# Patient Record
Sex: MALE | Race: WHITE | HISPANIC OR LATINO | URBAN - METROPOLITAN AREA
[De-identification: names, ages, dates, MRNs, and addresses within clinical notes are randomized per-mention and may not be internally consistent; named-entity substitution may affect disease eponyms.]

---

## 2024-03-11 ENCOUNTER — EMERGENCY (EMERGENCY)
Facility: HOSPITAL | Age: 54
LOS: 1 days | Discharge: HOME | End: 2024-03-11
Attending: EMERGENCY MEDICINE
Payer: COMMERCIAL

## 2024-03-11 VITALS
OXYGEN SATURATION: 99 % | DIASTOLIC BLOOD PRESSURE: 94 MMHG | TEMPERATURE: 98 F | RESPIRATION RATE: 16 BRPM | HEART RATE: 81 BPM | SYSTOLIC BLOOD PRESSURE: 151 MMHG

## 2024-03-11 VITALS
OXYGEN SATURATION: 95 % | HEART RATE: 105 BPM | RESPIRATION RATE: 16 BRPM | WEIGHT: 210.1 LBS | TEMPERATURE: 100 F | SYSTOLIC BLOOD PRESSURE: 138 MMHG | DIASTOLIC BLOOD PRESSURE: 80 MMHG

## 2024-03-11 DIAGNOSIS — Z98.890 OTHER SPECIFIED POSTPROCEDURAL STATES: Chronic | ICD-10-CM

## 2024-03-11 LAB
ALBUMIN SERPL ELPH-MCNC: 3.9 G/DL — SIGNIFICANT CHANGE UP (ref 3.5–5)
ALP SERPL-CCNC: 64 U/L — SIGNIFICANT CHANGE UP (ref 40–120)
ALT FLD-CCNC: 23 U/L DA — SIGNIFICANT CHANGE UP (ref 10–60)
ANION GAP SERPL CALC-SCNC: 5 MMOL/L — SIGNIFICANT CHANGE UP (ref 5–17)
ANION GAP SERPL CALC-SCNC: 6 MMOL/L — SIGNIFICANT CHANGE UP (ref 5–17)
AST SERPL-CCNC: 11 U/L — SIGNIFICANT CHANGE UP (ref 10–40)
BASOPHILS # BLD AUTO: 0.04 K/UL — SIGNIFICANT CHANGE UP (ref 0–0.2)
BASOPHILS NFR BLD AUTO: 0.5 % — SIGNIFICANT CHANGE UP (ref 0–2)
BILIRUB SERPL-MCNC: 0.9 MG/DL — SIGNIFICANT CHANGE UP (ref 0.2–1.2)
BUN SERPL-MCNC: 17 MG/DL — SIGNIFICANT CHANGE UP (ref 7–18)
BUN SERPL-MCNC: 17 MG/DL — SIGNIFICANT CHANGE UP (ref 7–18)
CALCIUM SERPL-MCNC: 8.1 MG/DL — LOW (ref 8.4–10.5)
CALCIUM SERPL-MCNC: 9.2 MG/DL — SIGNIFICANT CHANGE UP (ref 8.4–10.5)
CHLORIDE SERPL-SCNC: 105 MMOL/L — SIGNIFICANT CHANGE UP (ref 96–108)
CHLORIDE SERPL-SCNC: 98 MMOL/L — SIGNIFICANT CHANGE UP (ref 96–108)
CO2 SERPL-SCNC: 23 MMOL/L — SIGNIFICANT CHANGE UP (ref 22–31)
CO2 SERPL-SCNC: 26 MMOL/L — SIGNIFICANT CHANGE UP (ref 22–31)
CREAT SERPL-MCNC: 1.29 MG/DL — SIGNIFICANT CHANGE UP (ref 0.5–1.3)
CREAT SERPL-MCNC: 1.67 MG/DL — HIGH (ref 0.5–1.3)
EGFR: 49 ML/MIN/1.73M2 — LOW
EGFR: 67 ML/MIN/1.73M2 — SIGNIFICANT CHANGE UP
EOSINOPHIL # BLD AUTO: 0.02 K/UL — SIGNIFICANT CHANGE UP (ref 0–0.5)
EOSINOPHIL NFR BLD AUTO: 0.2 % — SIGNIFICANT CHANGE UP (ref 0–6)
GLUCOSE SERPL-MCNC: 143 MG/DL — HIGH (ref 70–99)
GLUCOSE SERPL-MCNC: 165 MG/DL — HIGH (ref 70–99)
HCT VFR BLD CALC: 45.3 % — SIGNIFICANT CHANGE UP (ref 39–50)
HGB BLD-MCNC: 15.3 G/DL — SIGNIFICANT CHANGE UP (ref 13–17)
HIV 1 & 2 AB SERPL IA.RAPID: SIGNIFICANT CHANGE UP
IMM GRANULOCYTES NFR BLD AUTO: 0.5 % — SIGNIFICANT CHANGE UP (ref 0–0.9)
LACTATE SERPL-SCNC: 1.2 MMOL/L — SIGNIFICANT CHANGE UP (ref 0.7–2)
LYMPHOCYTES # BLD AUTO: 1.14 K/UL — SIGNIFICANT CHANGE UP (ref 1–3.3)
LYMPHOCYTES # BLD AUTO: 13.5 % — SIGNIFICANT CHANGE UP (ref 13–44)
MAGNESIUM SERPL-MCNC: 2.1 MG/DL — SIGNIFICANT CHANGE UP (ref 1.6–2.6)
MCHC RBC-ENTMCNC: 30.7 PG — SIGNIFICANT CHANGE UP (ref 27–34)
MCHC RBC-ENTMCNC: 33.8 GM/DL — SIGNIFICANT CHANGE UP (ref 32–36)
MCV RBC AUTO: 90.8 FL — SIGNIFICANT CHANGE UP (ref 80–100)
MONOCYTES # BLD AUTO: 0.71 K/UL — SIGNIFICANT CHANGE UP (ref 0–0.9)
MONOCYTES NFR BLD AUTO: 8.4 % — SIGNIFICANT CHANGE UP (ref 2–14)
NEUTROPHILS # BLD AUTO: 6.47 K/UL — SIGNIFICANT CHANGE UP (ref 1.8–7.4)
NEUTROPHILS NFR BLD AUTO: 76.9 % — SIGNIFICANT CHANGE UP (ref 43–77)
NRBC # BLD: 0 /100 WBCS — SIGNIFICANT CHANGE UP (ref 0–0)
PHOSPHATE SERPL-MCNC: 1.7 MG/DL — LOW (ref 2.5–4.5)
PLATELET # BLD AUTO: 262 K/UL — SIGNIFICANT CHANGE UP (ref 150–400)
POTASSIUM SERPL-MCNC: 3.9 MMOL/L — SIGNIFICANT CHANGE UP (ref 3.5–5.3)
POTASSIUM SERPL-MCNC: 4 MMOL/L — SIGNIFICANT CHANGE UP (ref 3.5–5.3)
POTASSIUM SERPL-SCNC: 3.9 MMOL/L — SIGNIFICANT CHANGE UP (ref 3.5–5.3)
POTASSIUM SERPL-SCNC: 4 MMOL/L — SIGNIFICANT CHANGE UP (ref 3.5–5.3)
PROT SERPL-MCNC: 8.3 G/DL — SIGNIFICANT CHANGE UP (ref 6–8.3)
RAPID RVP RESULT: SIGNIFICANT CHANGE UP
RBC # BLD: 4.99 M/UL — SIGNIFICANT CHANGE UP (ref 4.2–5.8)
RBC # FLD: 12.2 % — SIGNIFICANT CHANGE UP (ref 10.3–14.5)
SARS-COV-2 RNA SPEC QL NAA+PROBE: SIGNIFICANT CHANGE UP
SODIUM SERPL-SCNC: 130 MMOL/L — LOW (ref 135–145)
SODIUM SERPL-SCNC: 133 MMOL/L — LOW (ref 135–145)
WBC # BLD: 8.42 K/UL — SIGNIFICANT CHANGE UP (ref 3.8–10.5)
WBC # FLD AUTO: 8.42 K/UL — SIGNIFICANT CHANGE UP (ref 3.8–10.5)

## 2024-03-11 PROCEDURE — 99284 EMERGENCY DEPT VISIT MOD MDM: CPT

## 2024-03-11 PROCEDURE — 80053 COMPREHEN METABOLIC PANEL: CPT

## 2024-03-11 PROCEDURE — 36415 COLL VENOUS BLD VENIPUNCTURE: CPT

## 2024-03-11 PROCEDURE — 0225U NFCT DS DNA&RNA 21 SARSCOV2: CPT

## 2024-03-11 PROCEDURE — 96374 THER/PROPH/DIAG INJ IV PUSH: CPT

## 2024-03-11 PROCEDURE — 86703 HIV-1/HIV-2 1 RESULT ANTBDY: CPT

## 2024-03-11 PROCEDURE — 80048 BASIC METABOLIC PNL TOTAL CA: CPT

## 2024-03-11 PROCEDURE — 84100 ASSAY OF PHOSPHORUS: CPT

## 2024-03-11 PROCEDURE — 83735 ASSAY OF MAGNESIUM: CPT

## 2024-03-11 PROCEDURE — 85025 COMPLETE CBC W/AUTO DIFF WBC: CPT

## 2024-03-11 PROCEDURE — 83605 ASSAY OF LACTIC ACID: CPT

## 2024-03-11 PROCEDURE — 96361 HYDRATE IV INFUSION ADD-ON: CPT

## 2024-03-11 PROCEDURE — 96375 TX/PRO/DX INJ NEW DRUG ADDON: CPT

## 2024-03-11 PROCEDURE — 99284 EMERGENCY DEPT VISIT MOD MDM: CPT | Mod: 25

## 2024-03-11 RX ORDER — METOCLOPRAMIDE HCL 10 MG
1 TABLET ORAL
Qty: 15 | Refills: 0
Start: 2024-03-11 | End: 2024-03-15

## 2024-03-11 RX ORDER — SODIUM CHLORIDE 9 MG/ML
2000 INJECTION INTRAMUSCULAR; INTRAVENOUS; SUBCUTANEOUS ONCE
Refills: 0 | Status: COMPLETED | OUTPATIENT
Start: 2024-03-11 | End: 2024-03-11

## 2024-03-11 RX ORDER — KETOROLAC TROMETHAMINE 30 MG/ML
15 SYRINGE (ML) INJECTION ONCE
Refills: 0 | Status: DISCONTINUED | OUTPATIENT
Start: 2024-03-11 | End: 2024-03-11

## 2024-03-11 RX ORDER — KETOROLAC TROMETHAMINE 30 MG/ML
1 SYRINGE (ML) INJECTION
Qty: 15 | Refills: 0
Start: 2024-03-11 | End: 2024-03-15

## 2024-03-11 RX ORDER — SODIUM CHLORIDE 9 MG/ML
1000 INJECTION, SOLUTION INTRAVENOUS ONCE
Refills: 0 | Status: COMPLETED | OUTPATIENT
Start: 2024-03-11 | End: 2024-03-11

## 2024-03-11 RX ORDER — SODIUM,POTASSIUM PHOSPHATES 278-250MG
1 POWDER IN PACKET (EA) ORAL ONCE
Refills: 0 | Status: COMPLETED | OUTPATIENT
Start: 2024-03-11 | End: 2024-03-11

## 2024-03-11 RX ORDER — METOCLOPRAMIDE HCL 10 MG
10 TABLET ORAL ONCE
Refills: 0 | Status: COMPLETED | OUTPATIENT
Start: 2024-03-11 | End: 2024-03-11

## 2024-03-11 RX ADMIN — SODIUM CHLORIDE 1000 MILLILITER(S): 9 INJECTION, SOLUTION INTRAVENOUS at 13:07

## 2024-03-11 RX ADMIN — Medication 1 PACKET(S): at 15:48

## 2024-03-11 RX ADMIN — Medication 15 MILLIGRAM(S): at 10:29

## 2024-03-11 RX ADMIN — SODIUM CHLORIDE 2000 MILLILITER(S): 9 INJECTION INTRAMUSCULAR; INTRAVENOUS; SUBCUTANEOUS at 12:21

## 2024-03-11 RX ADMIN — Medication 15 MILLIGRAM(S): at 12:28

## 2024-03-11 RX ADMIN — SODIUM CHLORIDE 4000 MILLILITER(S): 9 INJECTION INTRAMUSCULAR; INTRAVENOUS; SUBCUTANEOUS at 10:29

## 2024-03-11 RX ADMIN — Medication 10 MILLIGRAM(S): at 10:28

## 2024-03-11 NOTE — ED PROVIDER NOTE - OBJECTIVE STATEMENT
53 male with hx of [no known medical problems].   Pt presenting to the ED reporting 1 week of URI symptoms, myalgias, & headache.  Patient reports being seen in urgent care while visiting family in [place] and started on Augmentin a few days ago with no improvement.

## 2024-03-11 NOTE — ED PROVIDER NOTE - PATIENT PORTAL LINK FT
You can access the FollowMyHealth Patient Portal offered by [facility] by registering at the following website: [email or web address]. By joining [employer]’s FollowMyHealth portal, you will also be able to view your health information using other applications (apps) compatible with our system.

## 2024-03-11 NOTE — ED PROVIDER NOTE - CLINICAL SUMMARY MEDICAL DECISION MAKING FREE TEXT BOX
53 male with hx of [no known medical problems].   Pt presenting to the ED reporting 1 week of URI symptoms, myalgias, & headache.  Patient reports being seen in urgent care while visiting family in [place] and started on Augmentin a few days ago with no improvement.    Vitals with fever & tachycardia.  Nontoxic appearing, n/v intact.  Airway intact, no respiratory distress, no hypoxia.  No abdominal or CVA tenderness.  Nonfocal neuro.  No sinus tenderness.  Low suspicion of intracranial hemorrhage or meningoencephalitis infection.    Plan to obtain:    -Labs, IV fluids, analgesia/antiemetics needed, observe/reassess    Lab values demonstrate no acute/emergent pathology.  My independent interpretation of the EKG: Sinus @ [], normal axis, normal intervals, normal ST/T  My independent interpretation of XR: [No consolidation/effusion/pntx]    [***]    [Patient advised regarding need for close outpatient follow up.  Patient stable for further care in outpatient setting. No indication for inpatient admission at this time. Patient advised regarding symptomatic & supportive care and symptoms to prompt ED return. Strict return precautions provided.]    [Patient requires inpatient admission for further care & stabilization. Care signed out to inpatient team.] 53 male with hx of [no known medical problems].   Pt presenting to the ED reporting 1 week of URI symptoms, myalgias, & headache.  Patient reports being seen in urgent care while visiting family in [place] and started on Augmentin a few days ago with no improvement.    Vitals with fever & tachycardia.  Nontoxic appearing, n/v intact.  Airway intact, no respiratory distress, no hypoxia.  No abdominal or CVA tenderness.  Nonfocal neuro.  No sinus tenderness.  Low suspicion of intracranial hemorrhage or meningoencephalitis infection.    Plan to obtain:    -Labs, IV fluids, analgesia/antiemetics needed, observe/reassess    Lab values w initial decreased Cr likely from dehydration. Repeat BMP w improved Cr.  IV fluids, analgesia, & antipyretics given w improved symptoms.  Nonfocal neuro on reassessment.  Patient advised regarding need for close outpatient follow up.  Patient stable for further care in outpatient setting. No indication for inpatient admission at this time. Patient advised regarding symptomatic & supportive care and symptoms to prompt ED return. Strict return precautions provided.

## 2024-03-11 NOTE — ED PROVIDER NOTE - PHYSICAL EXAMINATION
Gen:  Awake, alert, NAD, WDWN, NCAT, non-toxic appearing. No skull/facial tender, no step-offs, no raccoon/ley.  Eyes:  PERRL, EOMI, no icterus, normal lids/lashes, normal conjunctivae.  ENT:  External inspection normal, pink/moist membranes. Pharynx normal, no pharyngeal erythema/exudate, no drooling, no lip/tongue/palate/posterior pharynx edema, midline uvula, no oropharyngeal ulcerations/lesions. No dental pain/tender, no septal hematoma, no sinus/tmj/dental/temporal/mastoid tender. TM's & canals normal b/l.  CV:  S1S2, regular rate and rhythm, no murmur/gallops/rubs, no JVD, 2+ pulses b/l, no edema/cords/homans, warm/well-perfused.  Resp:  Normal respiratory rate/effort, no respiratory distress, normal voice, speaking full sentences, lungs clear to auscultation b/l, no wheezing/rales/rhonchi, no retractions, no stridor.  Abd:  Soft abdomen, no tender/distended/guarding/rebound, no pulsatile mass, no CVA tender.   Musculoskeletal:  N/V intact, FROM all 4 extremities, normal motor tone, stable gait.   Neck:  FROM neck, supple, trachea midline, no meningismus.   Skin:  Color normal for race, warm and dry, no rash.  Neuro:  Oriented x3, CN 2-12 intact, normal motor, normal sensory, normal cerebellar, normal gait. GCS 15  Psych:  Attention normal. Affect normal. Behavior normal. Judgment normal.

## 2024-03-11 NOTE — ED PROVIDER NOTE - NSFOLLOWUPINSTRUCTIONS_ED_ALL_ED_FT
Please follow up with your PMD or Medicine Clinic in 2-3 days.  Return to the ER for worsening or concerning symptoms.  Drink plenty of fluids or an oral rehydration solution like Pedialyte, Gatorade, or Powerade.  Keep your diet simple until symptoms improve. Introduce foods as tolerated such as bread, toast, plain rice, boiled potatoes, boiled chicken, bananas, apple sauce, etc. Avoid dairy, spicy, greasy, or fatty foods. Avoid alcohol or tobacco.  Quarantine at home for 5-10 days after the start of symptoms. Isolate from any family members you live with   Take Acetaminophen (Tylenol) 650mg every 6 hours as needed for pain or fever.  Take Ketorolac (Toradol) 10mg every 8 hours as needed for pain with food.  Take Sudafed during the day & Benadryl at night for congestion & sinus pressure  Can also take Afrin for up to 3 days for nasal congestion  Take Metoclopramide (Reglan) every 8 hours as needed for nausea/vomiting or headache    - - - - - - - - - - - - -  Viral Respiratory Infection  A viral respiratory infection is an illness that affects parts of the body that are used for breathing. These include the lungs, nose, and throat. It is caused by a germ called a virus.    Some examples of this kind of infection are:  A cold.  The flu (influenza).  A respiratory syncytial virus (RSV) infection.  What are the causes?  This condition is caused by a virus. It spreads from person to person. You can get the virus if:  You breathe in droplets from someone who is sick.  You come in contact with people who are sick.  You touch mucus or other fluid from a person who is sick.  What are the signs or symptoms?  Symptoms of this condition include:  A stuffy or runny nose.  A sore throat.  A cough.  Shortness of breath.  Trouble breathing.  Yellow or green fluid in the nose.  Other symptoms may include:  A fever.  Sweating or chills.  Tiredness (fatigue).  Achy muscles.  A headache.  How is this treated?  This condition may be treated with:  Medicines that treat viruses.  Medicines that make it easy to breathe.  Medicines that are sprayed into the nose.  Acetaminophen or NSAIDs, such as ibuprofen, to treat fever.  Follow these instructions at home:  Managing pain and congestion    Take over-the-counter and prescription medicines only as told by your doctor.  If you have a sore throat, gargle with salt water. Do this 3–4 times a day or as needed.  To make salt water, dissolve ½–1 tsp (3–6 g) of salt in 1 cup (237 mL) of warm water. Make sure that all the salt dissolves.  Use nose drops made from salt water. This helps with stuffiness (congestion). It also helps soften the skin around your nose.  Take 2 tsp (10 mL) of honey at bedtime to lessen coughing at night.  Do not give honey to children who are younger than 1 year old.  Drink enough fluid to keep your pee (urine) pale yellow.  General instructions    A sign telling the reader not to smoke.  Rest as much as possible.  Do not drink alcohol.  Do not smoke or use any products that contain nicotine or tobacco. If you need help quitting, ask your doctor.  Keep all follow-up visits.  How is this prevented?    Washing hands with soap and water.  A person covering her mouth and nose with a cloth while sneezing.  Get a flu shot every year. Ask your doctor when you should get your flu shot.  Do not let other people get your germs. If you are sick:  Wash your hands with soap and water often. Wash your hands after you cough or sneeze. Wash hands for at least 20 seconds. If you cannot use soap and water, use hand .  Cover your mouth when you cough. Cover your nose and mouth when you sneeze.  Do not share cups or eating utensils.  Clean commonly used objects often. Clean commonly touched surfaces.  Stay home from work or school.  Avoid contact with people who are sick during cold and flu season. This is in fall and winter.  Get help if:  Your symptoms last for 10 days or longer.  Your symptoms get worse over time.  You have very bad pain in your face or forehead.  Parts of your jaw or neck get very swollen.  You have shortness of breath.  Get help right away if:  You feel pain or pressure in your chest.  You have trouble breathing.  You faint or feel like you will faint.  You keep vomiting and it gets worse.  You feel confused.  These symptoms may be an emergency. Get help right away. Call your local emergency services (911 in the [place]  Do not wait to see if the symptoms will go away.  Do not drive yourself to the hospital.  Summary  A viral respiratory infection is an illness that affects parts of the body that are used for breathing.  Examples of this illness include a cold, the flu, and a respiratory syncytial virus (RSV) infection.  The infection can cause a runny nose, cough, sore throat, and fever.  Follow what your doctor tells you about taking medicines, drinking lots of fluid, washing your hands, resting at home, and avoiding people who are sick.  This information is not intended to replace advice given to you by your health care provider. Make sure you discuss any questions you have with your health care provider.  - - - - - - - - - - - - -  Dehydration, Adult  Dehydration is a condition in which there is not enough water or other fluids in the body. This happens when a person loses more fluids than they take in. Important organs cannot work right without the right amount of fluids. Any loss of fluids from the body can cause dehydration.    Dehydration can be mild, worse, or very bad. It should be treated right away to keep it from getting very bad.    What are the causes?  Conditions that cause loss of water in the body. They include:  Watery poop (diarrhea).  Vomiting.  Sweating a lot.  Fever.  Infection.  Peeing (urinating) a lot.  Not drinking enough fluids.  Certain medicines, such as medicines that take extra fluid out of the body (diuretics).  Lack of safe drinking water.  Not being able to get enough water and food.  What increases the risk?  Having a long-term (chronic) illness that has not been treated the right way, such as:  Diabetes.  Heart disease.  Kidney disease.  Being 65 years of age or older.  Having a disability.  Living in a place that is high above the ground or sea (high in altitude). The thinner, drier air causes more fluid loss.  Doing exercises that put stress on your body for a long time.  Being active when in hot places.  What are the signs or symptoms?  Symptoms of dehydration depend on how bad it is.    Mild or worse dehydration    Thirst.  Dry lips or dry mouth.  Feeling dizzy or light-headed.  Muscle cramps.  Passing little pee or dark pee. Pee may be the color of tea.  Headache.  Very bad dehydration    Changes in skin. Skin may:  Be cold to the touch (clammy).  Be blotchy or pale.  Not go back to normal right after you pinch it and let it go.  Little or no tears, pee, or sweat.  Fast breathing.  Low blood pressure.  Weak pulse.  Pulse that is more than 100 beats a minute when you are sitting still.  Other changes, such as:  Feeling very thirsty.  Eyes that look hollow (sunken).  Cold hands and feet.  Being confused.  Being very tired (lethargic) or having trouble waking from sleep.  Losing weight.  Loss of consciousness.  How is this treated?  A person's hand, showing an inserted IV catheter.   Treatment for this condition depends on how bad your dehydration is. Treatment should start right away. Do not wait until your condition gets very bad. Very bad dehydration is an emergency. You will need to go to a hospital.  Mild or worse dehydration can be treated at home. You may be asked to:  Drink more fluids.  Drink an oral rehydration solution (ORS). This drink gives you the right amount of fluids, salts, and minerals (electrolytes).  Very bad dehydration can be treated:  With fluids through an IV tube.  By correcting low levels of electrolytes in the body.  By treating the problem that caused your dehydration.  Follow these instructions at home:  Oral rehydration solution    A glass of water with a spoonful of ORS ready to be added to it.  If told by your doctor, drink an ORS:  Make an ORS. Use instructions on the package.  Start by drinking small amounts, about ½ cup (120 mL) every 5–10 minutes.  Slowly drink more until you have had the amount that your doctor said to have.  Eating and drinking    A person drinking water from a glass.   Drink enough clear fluid to keep your pee pale yellow. If you were told to drink an ORS, finish the ORS first. Then, start slowly drinking other clear fluids. Drink fluids such as:  Water. Do not drink only water. Doing that can make the salt (sodium) level in your body get too low.  Water from ice chips you suck on.  Fruit juice that you have added water to (diluted).  Low-calorie sports drinks.  Eat foods that have the right amounts of salts and minerals, such as bananas, oranges, potatoes, tomatoes, or spinach.  Do not drink alcohol.  Avoid drinks that have caffeine or sugar. These include::  High-calorie sports drinks.  Fruit juice that you did not add water to.  Soda.  Coffee or energy drinks.  Avoid foods that are greasy or have a lot of fat or sugar.  General instructions    Take over-the-counter and prescription medicines only as told by your doctor.  Do not take sodium tablets. Doing that can make the salt level in your body get too high.  Return to your normal activities as told by your doctor. Ask your doctor what activities are safe for you.  Keep all follow-up visits. Your doctor may check and change your treatment.  Contact a doctor if:  You have pain in your belly (abdomen) and the pain:  Gets worse.  Stays in one place.  You have a rash.  You have a stiff neck.  You get angry or annoyed more easily than normal.  You are more tired or have a harder time waking than normal.  You feel weak or dizzy.  You feel very thirsty.  Get help right away if:  You have any symptoms of very bad dehydration.  You vomit every time you eat or drink.  Your vomiting gets worse, does not go away, or you vomit blood or green stuff.  You are getting treatment, but symptoms are getting worse.  You have a fever.  You have a very bad headache.  You have:  Diarrhea that gets worse or does not go away.  Blood in your poop (stool). This may cause poop to look black and tarry.  No pee in 6–8 hours.  Only a small amount of pee in 6–8 hours, and the pee is very dark.  You have trouble breathing.  These symptoms may be an emergency. Get help right away. Call 911.  Do not wait to see if the symptoms will go away.  Do not drive yourself to the hospital.  This information is not intended to replace advice given to you by your health care provider. Make sure you discuss any questions you have with your health care provider.

## 2024-03-11 NOTE — ED PROVIDER NOTE - NS ED ROS FT
Constitutional: (+) fever (+) chills  HENT: (+) congestion (+) rhinorrhea (-) sore throat (+) sinus pressure b/l  Eyes: (-) pain (-) redness  Respiratory: (-) cough (-) shortness of breath (-) wheezing (-) stridor    Cardiovascular: (-) chest pain (-) palpitations (-) leg swelling  Gastrointestinal: (-) abdominal pain (-) blood in stool (no melena/hematochezia) (-) diarrhea (-) vomiting  Genitourinary: (-) dysuria (-) hematuria  Musculoskeletal: (-) gait problem (-) joint swelling (+) myalgias  Skin: (-) color change (-) rash  Neurological: (-) weakness (-) numbness (+) headaches  Psychiatric/Behavioral: (-) confusion

## 2024-03-11 NOTE — ED PROVIDER NOTE - NSFOLLOWUPCLINICS_GEN_ALL_ED_FT
[facility]  Internal Medicine  [address] [place] [address]  Phone: [phone]  Fax: [phone]  Follow Up Time: 1-3 Days

## 2024-03-13 ENCOUNTER — EMERGENCY (EMERGENCY)
Facility: HOSPITAL | Age: 54
LOS: 1 days | Discharge: HOME | End: 2024-03-13
Attending: EMERGENCY MEDICINE
Payer: COMMERCIAL

## 2024-03-13 VITALS
RESPIRATION RATE: 18 BRPM | DIASTOLIC BLOOD PRESSURE: 78 MMHG | HEART RATE: 69 BPM | SYSTOLIC BLOOD PRESSURE: 134 MMHG | OXYGEN SATURATION: 96 % | TEMPERATURE: 98 F

## 2024-03-13 VITALS
HEIGHT: 71 IN | HEART RATE: 83 BPM | OXYGEN SATURATION: 98 % | TEMPERATURE: 98 F | SYSTOLIC BLOOD PRESSURE: 124 MMHG | WEIGHT: 210.1 LBS | DIASTOLIC BLOOD PRESSURE: 84 MMHG | RESPIRATION RATE: 18 BRPM

## 2024-03-13 DIAGNOSIS — Z98.890 OTHER SPECIFIED POSTPROCEDURAL STATES: Chronic | ICD-10-CM

## 2024-03-13 PROBLEM — Z78.9 OTHER SPECIFIED HEALTH STATUS: Chronic | Status: ACTIVE | Noted: 2024-03-11

## 2024-03-13 LAB
ANION GAP SERPL CALC-SCNC: 6 MMOL/L — SIGNIFICANT CHANGE UP (ref 5–17)
APTT BLD: 30.9 SEC — SIGNIFICANT CHANGE UP (ref 24.5–35.6)
BASOPHILS # BLD AUTO: 0.05 K/UL — SIGNIFICANT CHANGE UP (ref 0–0.2)
BASOPHILS NFR BLD AUTO: 0.7 % — SIGNIFICANT CHANGE UP (ref 0–2)
BUN SERPL-MCNC: 13 MG/DL — SIGNIFICANT CHANGE UP (ref 7–18)
CALCIUM SERPL-MCNC: 8.7 MG/DL — SIGNIFICANT CHANGE UP (ref 8.4–10.5)
CHLORIDE SERPL-SCNC: 104 MMOL/L — SIGNIFICANT CHANGE UP (ref 96–108)
CO2 SERPL-SCNC: 26 MMOL/L — SIGNIFICANT CHANGE UP (ref 22–31)
CREAT SERPL-MCNC: 1.26 MG/DL — SIGNIFICANT CHANGE UP (ref 0.5–1.3)
EGFR: 69 ML/MIN/1.73M2 — SIGNIFICANT CHANGE UP
EOSINOPHIL # BLD AUTO: 0.08 K/UL — SIGNIFICANT CHANGE UP (ref 0–0.5)
EOSINOPHIL NFR BLD AUTO: 1 % — SIGNIFICANT CHANGE UP (ref 0–6)
GLUCOSE SERPL-MCNC: 140 MG/DL — HIGH (ref 70–99)
HCT VFR BLD CALC: 41.4 % — SIGNIFICANT CHANGE UP (ref 39–50)
HGB BLD-MCNC: 14 G/DL — SIGNIFICANT CHANGE UP (ref 13–17)
IMM GRANULOCYTES NFR BLD AUTO: 0.4 % — SIGNIFICANT CHANGE UP (ref 0–0.9)
INR BLD: 1.21 RATIO — HIGH (ref 0.85–1.18)
LYMPHOCYTES # BLD AUTO: 1.23 K/UL — SIGNIFICANT CHANGE UP (ref 1–3.3)
LYMPHOCYTES # BLD AUTO: 16 % — SIGNIFICANT CHANGE UP (ref 13–44)
MCHC RBC-ENTMCNC: 31 PG — SIGNIFICANT CHANGE UP (ref 27–34)
MCHC RBC-ENTMCNC: 33.8 GM/DL — SIGNIFICANT CHANGE UP (ref 32–36)
MCV RBC AUTO: 91.8 FL — SIGNIFICANT CHANGE UP (ref 80–100)
MONOCYTES # BLD AUTO: 0.55 K/UL — SIGNIFICANT CHANGE UP (ref 0–0.9)
MONOCYTES NFR BLD AUTO: 7.2 % — SIGNIFICANT CHANGE UP (ref 2–14)
NEUTROPHILS # BLD AUTO: 5.73 K/UL — SIGNIFICANT CHANGE UP (ref 1.8–7.4)
NEUTROPHILS NFR BLD AUTO: 74.7 % — SIGNIFICANT CHANGE UP (ref 43–77)
NRBC # BLD: 0 /100 WBCS — SIGNIFICANT CHANGE UP (ref 0–0)
PLATELET # BLD AUTO: 240 K/UL — SIGNIFICANT CHANGE UP (ref 150–400)
POTASSIUM SERPL-MCNC: 3.7 MMOL/L — SIGNIFICANT CHANGE UP (ref 3.5–5.3)
POTASSIUM SERPL-SCNC: 3.7 MMOL/L — SIGNIFICANT CHANGE UP (ref 3.5–5.3)
PROTHROM AB SERPL-ACNC: 13.7 SEC — HIGH (ref 9.5–13)
RBC # BLD: 4.51 M/UL — SIGNIFICANT CHANGE UP (ref 4.2–5.8)
RBC # FLD: 12.1 % — SIGNIFICANT CHANGE UP (ref 10.3–14.5)
SODIUM SERPL-SCNC: 136 MMOL/L — SIGNIFICANT CHANGE UP (ref 135–145)
WBC # BLD: 7.67 K/UL — SIGNIFICANT CHANGE UP (ref 3.8–10.5)
WBC # FLD AUTO: 7.67 K/UL — SIGNIFICANT CHANGE UP (ref 3.8–10.5)

## 2024-03-13 PROCEDURE — 36415 COLL VENOUS BLD VENIPUNCTURE: CPT

## 2024-03-13 PROCEDURE — 70450 CT HEAD/BRAIN W/O DYE: CPT | Mod: 26,MC

## 2024-03-13 PROCEDURE — 80048 BASIC METABOLIC PNL TOTAL CA: CPT

## 2024-03-13 PROCEDURE — 85025 COMPLETE CBC W/AUTO DIFF WBC: CPT

## 2024-03-13 PROCEDURE — 96374 THER/PROPH/DIAG INJ IV PUSH: CPT

## 2024-03-13 PROCEDURE — 99284 EMERGENCY DEPT VISIT MOD MDM: CPT | Mod: 25

## 2024-03-13 PROCEDURE — 85730 THROMBOPLASTIN TIME PARTIAL: CPT

## 2024-03-13 PROCEDURE — 70450 CT HEAD/BRAIN W/O DYE: CPT | Mod: MC

## 2024-03-13 PROCEDURE — 99285 EMERGENCY DEPT VISIT HI MDM: CPT

## 2024-03-13 PROCEDURE — 85610 PROTHROMBIN TIME: CPT

## 2024-03-13 RX ORDER — SODIUM CHLORIDE 9 MG/ML
1000 INJECTION INTRAMUSCULAR; INTRAVENOUS; SUBCUTANEOUS ONCE
Refills: 0 | Status: COMPLETED | OUTPATIENT
Start: 2024-03-13 | End: 2024-03-13

## 2024-03-13 RX ORDER — MORPHINE SULFATE 50 MG/1
4 CAPSULE, EXTENDED RELEASE ORAL ONCE
Refills: 0 | Status: DISCONTINUED | OUTPATIENT
Start: 2024-03-13 | End: 2024-03-13

## 2024-03-13 RX ADMIN — MORPHINE SULFATE 4 MILLIGRAM(S): 50 CAPSULE, EXTENDED RELEASE ORAL at 04:42

## 2024-03-13 RX ADMIN — SODIUM CHLORIDE 1000 MILLILITER(S): 9 INJECTION INTRAMUSCULAR; INTRAVENOUS; SUBCUTANEOUS at 04:42

## 2024-03-13 NOTE — ED ADULT NURSE NOTE - NSFALLUNIVINTERV_ED_ALL_ED
Bed/Stretcher in lowest position, wheels locked, appropriate side rails in place/Call bell, personal items and telephone in reach/Instruct patient to call for assistance before getting out of bed/chair/stretcher/Non-slip footwear applied when patient is off stretcher/[place] to call system/Physically safe environment - no spills, clutter or unnecessary equipment/Purposeful proactive rounding/Room/bathroom lighting operational, light cord in reach

## 2024-03-13 NOTE — ED PROVIDER NOTE - NSFOLLOWUPCLINICS_GEN_ALL_ED_FT
[facility]  Neurology  [address] [place] [address]  Phone: [phone]  Fax: [phone]

## 2024-03-13 NOTE — ED PROVIDER NOTE - CLINICAL SUMMARY MEDICAL DECISION MAKING FREE TEXT BOX
CT reported no acute intracranial pathology. There is left maxillary and sphenoid sinus mucosal thickening. The   mastoid air cells are well aerated.   6:40 AM patient feels better, states headache resolved.  Patient advised to continue with antibiotics given report of maxillary and sphenoid sinus mucosal thickening.  Patient requesting new prescription for antibiotics.  I will prescribe patient amoxicillin.  Return precautions explained and questions answered.  Pt is well appearing, has no new complaints and able to walk with normal gait. Pt is stable for discharge and follow up with medical doctor. Pt educated on care and need for follow up. Discussed anticipatory guidance and return precautions. Questions answered. I had a detailed discussion with the patient regarding the historical points, exam findings, and any diagnostic results supporting the discharge diagnosis. CT reported no acute intracranial pathology. There is left maxillary and sphenoid sinus mucosal thickening. The   mastoid air cells are well aerated.   6:40 AM patient feels better, states headache resolved.  Patient advised to continue with antibiotics given report of maxillary and sphenoid sinus mucosal thickening.  Patient requesting new prescription for antibiotics.  I will prescribe patient amoxicillin. Pt already has rx for PO Ketorolac and Robaxin.   Return precautions explained and questions answered.  Pt is well appearing, has no new complaints and able to walk with normal gait. Pt is stable for discharge and follow up with medical doctor. Pt educated on care and need for follow up. Discussed anticipatory guidance and return precautions. Questions answered. I had a detailed discussion with the patient regarding the historical points, exam findings, and any diagnostic results supporting the discharge diagnosis.

## 2024-03-13 NOTE — ED PROVIDER NOTE - PATIENT PORTAL LINK FT
You can access the FollowMyHealth Patient Portal offered by [facility] by registering at the following website: [email or web address]. By joining [employer]’s FollowMyHealth portal, you will also be able to view your health information using other applications (apps) compatible with our system.

## 2024-03-13 NOTE — ED PROVIDER NOTE - PHYSICAL EXAMINATION
Kernig and Brudzinski signs area negative, no petechiae, no photophobia, no dysarthria, no facial asymmetry, no focal deficits.   NIH stroke scale is zero. Pt passed dysphagia screen.  + Frontal and b/l maxillary discomfort to palpation.  Extra ocular muscles intact, pupils 3mm and reactive to light, no photophobia, no pain with eye movement, no hyphema, OD/OS 20/20.  Right IOP 21  Left IOP 12

## 2024-03-13 NOTE — ED ADULT TRIAGE NOTE - CHIEF COMPLAINT QUOTE
as per pt with pain in the face /eyes head ,seen in ED yesterday with same symptoms,pt states it does not go away,with nausea and vomiting ,took medications abner was presctibed they don't work

## 2024-03-13 NOTE — ED PROVIDER NOTE - OBJECTIVE STATEMENT
53-year-old male chief complaint of headache described as pressure sensation behind both eyes for 1 week.  Patient states was initially seen at urgent care and prescribed Augmentin,   However patient admits to noncompliance with the antibiotic.  Patient seen in ED on March 11, 2024 diagnosed with viral  syndrome and dehydration.   patient states felt better after first discharged from ED however symptoms returned last night.  No reported fever, no neck pain, no vision changes.

## 2024-04-11 PROBLEM — Z00.00 ENCOUNTER FOR PREVENTIVE HEALTH EXAMINATION: Status: ACTIVE | Noted: 2024-04-11

## 2024-04-12 DIAGNOSIS — Z86.012 PERSONAL HISTORY OF BENIGN CARCINOID TUMOR: ICD-10-CM

## 2024-04-12 DIAGNOSIS — Z87.09 PERSONAL HISTORY OF OTHER DISEASES OF THE RESPIRATORY SYSTEM: ICD-10-CM

## 2024-04-12 DIAGNOSIS — Z87.2 PERSONAL HISTORY OF DISEASES OF THE SKIN AND SUBCUTANEOUS TISSUE: ICD-10-CM

## 2024-04-15 ENCOUNTER — APPOINTMENT (OUTPATIENT)
Dept: NEUROLOGY | Facility: CLINIC | Age: 54
End: 2024-04-15
Payer: COMMERCIAL

## 2024-04-15 ENCOUNTER — NON-APPOINTMENT (OUTPATIENT)
Age: 54
End: 2024-04-15

## 2024-04-15 VITALS
SYSTOLIC BLOOD PRESSURE: 130 MMHG | HEIGHT: 70 IN | DIASTOLIC BLOOD PRESSURE: 88 MMHG | WEIGHT: 195 LBS | OXYGEN SATURATION: 95 % | BODY MASS INDEX: 27.92 KG/M2 | HEART RATE: 108 BPM

## 2024-04-15 DIAGNOSIS — R42 DIZZINESS AND GIDDINESS: ICD-10-CM

## 2024-04-15 DIAGNOSIS — R51.9 HEADACHE, UNSPECIFIED: ICD-10-CM

## 2024-04-15 DIAGNOSIS — H57.9 UNSPECIFIED DISORDER OF EYE AND ADNEXA: ICD-10-CM

## 2024-04-15 DIAGNOSIS — G47.00 INSOMNIA, UNSPECIFIED: ICD-10-CM

## 2024-04-15 PROCEDURE — G2211 COMPLEX E/M VISIT ADD ON: CPT

## 2024-04-15 PROCEDURE — 99205 OFFICE O/P NEW HI 60 MIN: CPT

## 2024-04-15 RX ORDER — GLUCOSAMINE/MSM/CHONDROIT SULF 500-166.6
10 TABLET ORAL
Refills: 0 | Status: ACTIVE | COMMUNITY
Start: 2024-04-15

## 2024-04-15 RX ORDER — KETOROLAC TROMETHAMINE 10 MG
10 TABLET ORAL
Refills: 0 | Status: DISCONTINUED | COMMUNITY
End: 2024-04-15

## 2024-04-15 RX ORDER — METOCLOPRAMIDE 10 MG/1
10 TABLET ORAL
Refills: 0 | Status: DISCONTINUED | COMMUNITY
End: 2024-04-15

## 2024-04-15 RX ORDER — METHYLPREDNISOLONE 4 MG/1
4 TABLET ORAL
Qty: 1 | Refills: 0 | Status: COMPLETED | COMMUNITY
Start: 2024-04-15 | End: 2024-05-15

## 2024-04-15 RX ORDER — OMEPRAZOLE 40 MG/1
40 CAPSULE, DELAYED RELEASE ORAL
Qty: 10 | Refills: 0 | Status: COMPLETED | COMMUNITY
Start: 2024-04-15 | End: 2024-04-25

## 2024-04-16 NOTE — DATA REVIEWED
[de-identified] : EXAM:  CT BRAIN   ORDERED BY: CORY EGAN PROCEDURE DATE:  03/13/2024 INTERPRETATION:  Clinical Indication: Headache for one week. Comparison: None Technique: Noncontrast axial CT images of the head were acquired. Coronal and sagittal reformats were obtained. Findings: The ventricles and sulci are normal in size for the patient's stated age.  No acute intracranial hemorrhage is identified.  There is no extra-axial fluid collection. No mass effect or midline shift is seen.  There is no evidence of acute territorial infarct. There is left maxillary and sphenoid sinus mucosal thickening. The mastoid air cells are well aerated.  No acute osseous abnormality is seen. Impression: No acute intracranial hemorrhage or mass effect. Follow-up MRI is recommended if symptoms persist.  [de-identified] : 3/27/2024 MRI BRAIN MUCOSAL THICKENING OF PARANASAL SINUSES WITH AIR FLUID LEVEL IN LEFT SPHENOPID SINUS. MASTOID AIR CELL T2 HYPERINTENSITY ON RIGHT. CONSIDER BENIGH SEOUS EFFUSION VERSUS MASTOIDITIS.

## 2024-04-16 NOTE — PHYSICAL EXAM
[FreeTextEntry1] : Physical exam head turn to right and to left (90 degrees) triggerS symptoms of vertigo during exam.  Mental status:  Orientation: Alert, oriented to time place and person                                                                                                                                Speech is  clear fluent ,, repeat a sentence                                                                                                                                                                                                                                     Neglect: absent                                                                                                                                                                                                                                                                      Agnosia is absent                                                                                                                                           Cranial nerves:  CN I deferred. CN  II VFF; horizontal eye movement triggers symptoms of vertigo during exam III, IV, VI: PERRLA, no APD, EOM IV: Facial sensation normal B/L to touch, pinprick VII: Facial strength normal B/L. VIII: Gross hearing intact IX, X: palate midline and elevates symmetrically XI: Trapezius normal strength, XII: Tongue midline without atrophy or fasciculation  Motor: Muscle tone no rigidity or resistance in all 4 extremities. No atrophy or fasciculation. BUE (+) mild tremor during bilateral arm raise.  Muscle strength: arms and legs, proximal and distal flexors and extensors are normal 5/5 . No UE drift.  Sensory: intact to pinprick and vibration  Reflexes: all present, normal, and symmetrical 2+   Coordination: finger to nose: normal. (+) tremor during b/l FTN.  Heel to shin:  Gait: steady normal based, unsteady tandem walk.  Romberg test positive.

## 2024-04-16 NOTE — ASSESSMENT
[FreeTextEntry1] : Mr. REBECCA FELIPE is a 53 year old male here today for neurology consultation for new persistent headache that began 6 weeks ago accompanied by symptoms of vertigo (chronic vertigo since his 20's). This new headache is associated with nausea, vomiting, phonophobia and photophobia. Horizontal eye and head movement trigger symptoms of vertigo and worsens headache severity. CT Head -> left maxillary and sphenoid sinus mucosal thickening. MRI Head with and without contrast reviewed-> no intracranial abnormality but thickening of paranasal sinuses noted. Augmentin was prescribed by medical provider though pt states he did not complete it. He is taking advil 400mg daily without adequate improvement and is out of work for the past 6 weeks. He was unable to tolerate amitriptyline related to sleepiness. Absence due to Illness work form was completed during today's visit and handed back to pt. .  Imp: Persistent Daily Headache                            Plan of care Medications - start medrol dose pack; take with food. Med compliance reviewed.  Start omeprazole 40mg po daily x 10 days only (ppx for gastritis)  Testing - MR Angio Head and MR Venogram ordered Referrals - ophthalmology referral ordered to be completed at earliest availability (Right IOP 2, Left IOP 12).  Fluids - goal target 8 cups water daily Exercise - goal 30 mins x 5 days weekly Maintain headache log and bring to neurology visits.  Neurology follow-up - 6-8 weeks. Pt to call office with status of symptoms in 2 weeks. May consider Neurontin or Topamax next based on follow-up assessment.
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
## 2024-05-29 ENCOUNTER — APPOINTMENT (OUTPATIENT)
Dept: NEUROLOGY | Facility: CLINIC | Age: 54
End: 2024-05-29
Payer: COMMERCIAL

## 2024-05-29 VITALS
WEIGHT: 200 LBS | OXYGEN SATURATION: 97 % | HEART RATE: 80 BPM | BODY MASS INDEX: 28.63 KG/M2 | HEIGHT: 70 IN | SYSTOLIC BLOOD PRESSURE: 138 MMHG | DIASTOLIC BLOOD PRESSURE: 82 MMHG

## 2024-05-29 PROCEDURE — 99215 OFFICE O/P EST HI 40 MIN: CPT

## 2024-05-29 RX ORDER — MECLIZINE HYDROCHLORIDE 25 MG/1
25 TABLET ORAL
Refills: 0 | Status: DISCONTINUED | COMMUNITY
Start: 2024-04-15 | End: 2024-05-29

## 2024-05-29 NOTE — ASSESSMENT
[FreeTextEntry1] : Mr. REBECCA FELIPE is a 53 year old male here today for neurology consultation for new persistent headache that began 6 weeks ago accompanied by symptoms of vertigo (chronic vertigo since his 20's). This new headache is associated with nausea, vomiting, phonophobia and photophobia. Horizontal eye and head movement trigger symptoms of vertigo and worsens headache severity. CT Head -> left maxillary and sphenoid sinus mucosal thickening. MRI Head with and without contrast reviewed-> no intracranial abnormality but thickening of paranasal sinuses noted. Augmentin was prescribed by medical provider though pt states he did not complete it. He is taking advil 400mg daily without adequate improvement and is out of work for the past 6 weeks. He was unable to tolerate amitriptyline related to sleepiness. Absence due to Illness work form was completed during today's visit and handed back to pt..  Imp: Persistent Daily Headache   improved with steroids No neurologic deficits on exam                         no abnormality noted on MR Angio Head and MR Venogram ordered Cleared to return to work-- Filled 2 forms for the patient during this visit.  Follow up PRN

## 2024-05-29 NOTE — DATA REVIEWED
[de-identified] : 3/27/2024 MRI BRAIN MUCOSAL THICKENING OF PARANASAL SINUSES WITH AIR FLUID LEVEL IN LEFT SPHENOPID SINUS. MASTOID AIR CELL T2 HYPERINTENSITY ON RIGHT. CONSIDER BENIGH SEOUS EFFUSION VERSUS MASTOIDITIS.   4/27/24  MRV patent dural veins 4/27/24 MRA marlene : patent vessels with no aneurys, high flow avm, stenosis , vasculitis or dissection   [de-identified] : EXAM:  CT BRAIN   ORDERED BY: CORY EGAN PROCEDURE DATE:  03/13/2024 INTERPRETATION:  Clinical Indication: Headache for one week. Comparison: None Technique: Noncontrast axial CT images of the head were acquired. Coronal and sagittal reformats were obtained. Findings: The ventricles and sulci are normal in size for the patient's stated age.  No acute intracranial hemorrhage is identified.  There is no extra-axial fluid collection. No mass effect or midline shift is seen.  There is no evidence of acute territorial infarct. There is left maxillary and sphenoid sinus mucosal thickening. The mastoid air cells are well aerated.  No acute osseous abnormality is seen. Impression: No acute intracranial hemorrhage or mass effect. Follow-up MRI is recommended if symptoms persist.

## 2024-05-29 NOTE — HISTORY OF PRESENT ILLNESS
[FreeTextEntry1] : Mr. REBECCA FELIPE is a 53 year old male here today for neurology consultation for new persistent headache x8 weeks. that resolved with Medrol dose pack Pt denies hx URI or infection prior to development of headache. Hx  repeated attacks of vertigo since his 20's (last attack one year ago) presents for new onset headaches. Denies hx migraine headaches in the past. Currently has no complaints. He only notices slight dizzy sensation when he changes position. He saw his ophthalmologist who told him no issues with eyes or intraocular pressure.